# Patient Record
Sex: MALE | Race: OTHER
[De-identification: names, ages, dates, MRNs, and addresses within clinical notes are randomized per-mention and may not be internally consistent; named-entity substitution may affect disease eponyms.]

---

## 2020-10-23 ENCOUNTER — HOSPITAL ENCOUNTER (EMERGENCY)
Dept: HOSPITAL 15 - ER | Age: 30
LOS: 1 days | Discharge: HOME | End: 2020-10-24
Payer: MEDICAID

## 2020-10-23 VITALS — BODY MASS INDEX: 27.62 KG/M2 | WEIGHT: 176 LBS | HEIGHT: 67 IN
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
## 2020-10-23 DIAGNOSIS — S01.512A: Primary | ICD-10-CM
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
## 2020-10-23 DIAGNOSIS — Z88.6: ICD-10-CM
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
## 2020-10-23 PROCEDURE — 41251 REPAIR TONGUE LACERATION: CPT

## 2020-10-23 PROCEDURE — 99284 EMERGENCY DEPT VISIT MOD MDM: CPT

## 2020-10-24 VITALS — DIASTOLIC BLOOD PRESSURE: 61 MMHG | SYSTOLIC BLOOD PRESSURE: 111 MMHG

## 2025-01-24 ENCOUNTER — HOSPITAL ENCOUNTER (EMERGENCY)
Dept: HOSPITAL 15 - ER | Age: 35
Discharge: HOME | End: 2025-01-24
Payer: MEDICAID

## 2025-01-24 VITALS
HEART RATE: 95 BPM | RESPIRATION RATE: 22 BRPM | DIASTOLIC BLOOD PRESSURE: 98 MMHG | SYSTOLIC BLOOD PRESSURE: 135 MMHG | OXYGEN SATURATION: 100 %

## 2025-01-24 VITALS — BODY MASS INDEX: 24.64 KG/M2 | WEIGHT: 156.97 LBS | HEIGHT: 67 IN

## 2025-01-24 DIAGNOSIS — Z88.8: ICD-10-CM
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
## 2025-01-24 DIAGNOSIS — F15.10: Primary | ICD-10-CM

## 2025-01-24 DIAGNOSIS — R06.02: ICD-10-CM

## 2025-01-24 DIAGNOSIS — F10.90: ICD-10-CM

## 2025-01-24 PROCEDURE — 93005 ELECTROCARDIOGRAM TRACING: CPT

## 2025-01-24 NOTE — ED.PDOC
History of Present Illness


HPI Comments


34-year-old male presents with a chief complaint of SOB and anxiety s/p meth use

x 1 hour ago. Patient states that he used IV meth for the second time x 1 hour 

ago. Patient reports that now he feels anxious and SOB. Patient is sating at 99%

on room air and able to speak in full, complete sentences. Patient denies any 

chest pain at this time. Patient endorses drinking a tall can, vape pen, and IV 

meth all together x 1 hour ago. Patient is lethargic in triage. No other 

symptoms or modifying factors present at this time.


Chief Complaint:  Anxiety


Time Seen by MD:  06:37


Primary Care Provider:  NATE Nj Notes:  Medications, Allergies


Allergies:  


Coded Allergies:  


     Acetaminophen (Verified  Allergy, Unknown, 10/23/20)


Information Source:  Patient


Mode of Arrival:  Ambulatory


Severity:  Moderate


Timing:  Hours


Duration:  Since onset


Prehospital treatment:  None





Past Medical History


PAST MEDICAL HISTORY:  Denies


Surgical History:  Denies all surgeries





Family History


Family History:  Reviewed,noncontributory to illness, No family hx of Cancer, No

family hx of DM, No family hx of Heart bari, No family hx of HTN, No family hx 

ofKidney bari, No family hx of Liver bari, No family hx of Lung bari, No family hx 

of Stroke





Social History


Smoker:  Non-Smoker


Alcohol:  Heavy


Drugs:  Methamphetamine


Lives In:  Home





Constitutional:  denies: chills, diaphoresis, fatigue, fever, malaise, sweats, 

weakness, others


EENTM:  denies: blurred vision, double vision, ear bleeding, ear discharge, ear 

drainage, ear pain, ear ringing, eye pain, eye redness, hearing loss, mouth 

pain, mouth swelling, nasal discharge, nose bleeding, nose congestion, nose 

pain, photophobia, tearing, throat pain, throat swelling, voice changes, others


Respiratory:  reports: shortness of breath; denies: cough, hemoptysis, 

orthopnea, SOB at rest, SOB with excertion, stridor, wheezing, others


Cardiovascular:  denies: chest pain, dizzy spells, diaphoresis, Dyspnea on 

exertion, edema, irregular heart beat, left arm pain, lightheadedness, 

palpitations, PND, syncope, others


Gastrointestinal:  denies: abdomen distended, abdominal pain, blood streaked 

bowels, constipated, diarrhea, dysphagia, difficulty swallowing, hematemesis, 

melena, nausea, poor appetite, poor fluid intake, rectal bleeding, rectal pain, 

vomiting, others


Genitourinary:  denies: burning, dysuria, flank pain, frequency, hematuria, 

incontinence, penile discharge, penile sore, pain, testicle pain, testicle 

swelling, urgency, others


Neurological:  denies: dizziness, fainting, headache, left sided numbness, left 

sided weakness, numbness, paresthesia, pre-existing deficit, right sided 

numbness, right sided weakness, seizure, speech problems, tingling, tremors, 

weakness, others


Musculoskeletal:  denies: back pain, gout, joint pain, joint swelling, muscle 

pain, muscle stiffness, neck pain, others


Integumetry:  denies: bruises, change in color, change in hair/nails, dryness, 

laceration, lesions, lumps, rash, wounds, others


Allergic/Immunocompromised:  denies: Difficulty Healing, Frequent Infections, 

Hives, Itching, others


Hematologic/Lymphatic:  denies: anemia, blood clots, easy bleeding, easy 

bruising, swollen glands, others


Endocrine:  denies: excessive hunger, excessive sweating, excessive thirst, 

excessive urination, flushing, intolerance to cold, intolerance to heat, 

unexplained weight gain, unexplained weight loss, others


Psychiatric:  reports: anxiety; denies: bipolar disorder, depression, hopeless, 

panic disorder, schizophrenia, sleepless, suicidal, others


All Other Systems:  Reviewed and Negative





Physical Exam


General Appearance:  No Apparent Distress, Normal, Other (SLEEPY, BUT AWAKENS TO

VOICE AND ANSWERS QUESTIONS)


HEENT:  Normal ENT Inspection, Pharynx Normal, TMs Normal


Neck:  Full Range of Motion, Non-Tender, Normal, Normal Inspection


Respiratory:  Chest Non-Tender, Lungs Clear, No Accessory Muscle Use, No 

Respiratory Distress, Normal Breath Sounds, Other (SHALLOW BREATHING; POOR 

EFFORT)


Cardiovascular:  No Edema, No JVD, No Murmur, No Gallop, Normal Peripheral 

Pulses, Regular Rate/Rhythm


Breast Exam:  Deferred


Gastrointestinal:  No Organomegaly, Non Tender, No Pulsatile Mass, Normal Bowel 

Sounds, Soft


Genitalia:  Deferred


Pelvic:  Deferred


Rectal:  Deferred


Extremities:  No calf tenderness, Normal capillary refill, Normal inspection, 

Normal range of motion, Non-tender, No pedal edema


Musculoskeletal :  


   Apperance:  Normal


Neurologic:  Alert, CNs II-XII nml as Tested, No Motor Deficits, Normal Affect, 

Normal Mood, No Sensory Deficits


Cerebellar Function:  Normal


Reflexes:  Normal


Skin:  Dry, Normal Color, Warm


Lymphatic:  No Adenopathy





Was a procedure done?


Was a procedure done?:  No





EKG


EKG :  


Comments


Normal sinus rhythm rate of 77 no ST changes





Differential Dx


Considerations may include:


METH USE, HEROIN USE, PNEUMONIA, PNEUMOTHORAX, COUGH, DRUG INDUCED ANXIETY





X-Ray, Labs, Meds, VS





                                   Vital Signs








  Date Time  Temp Pulse Resp B/P (MAP) Pulse Ox O2 Delivery O2 Flow Rate FiO2


 


1/24/25 06:16 98.2 95 22 135/98 (110) 100   





34-year-old male presents here with anxiety by triage nurse.  On my evaluation 

he was calm and very sleepy.  He does report some shortness of breath.  He had 

shallow breathing on my evaluation as he was very sleepy.  EKG has been done 

with no significant abnormality.  Chest x-ray has been ordered.  However prior 

to obtaining a chest x-ray patient has eloped.


Time of 1ST Reevaluation:  07:07


Reevaluation 1ST:  Unchanged


Patient Education/Counseling:  Diagnosis, Treatment, Prognosis


Family Education/Counseling:  No Family Present





Departure 1


Departure


Time of Disposition:  06:59


Impression:  


   Primary Impression:  


   Methamphetamine abuse


   Additional Impression:  


   SOB (shortness of breath)


Disposition:  01 HOME / SELF CARE / HOMELESS


Condition:  Stable


Discharged With:  Self





Critical Care Note


Critical Care Time?:  No





Stability


Stability form required:  No





Heart Score


Heart Score:  








Heart Score Response (Comments) Value


 


History Slightly Suspicious 0


 


EKG Normal 0


 


Age <45 0


 


Risk Factors                            1 or 2 risk factors 1


 


Troponin N/A 0


 


Total  1














I personally scribed for MARCO RAGSDALE MD ([email or web address]) on 1/24/25 at 06:59.  

Electronically submitted by Bill Talavera ([email or web address]).





MARCO RAGSDALE MD         Jan 24, 2025 06:59

## 2025-01-30 NOTE — ECG
[facility]

                                       

Test Date:    2025               Test Time:    06:21:58

Pat Name:     VIKAS ZACARIAS          Department:   ER

Patient ID:   [id number]           Room:          

Gender:       M                        Technician:   

:          1990               Requested By: MARCO RAGSDALE

Order Number: [id number]        Reading MD:   Igor Lara

                                 Measurements

Intervals                              Axis          

Rate:         77                       P:            57

NV:           165                      QRS:          85

QRSD:         104                      T:            43

QT:           360                                    

QTc:          408                                    

                           Interpretive Statements

Sinus arrhythmia

RSR' in V1 or V2, probably normal variant

ST elev, probable normal early repol pattern

Electronically Signed On 2025 10:16:53 PST by Igor Lara



Please click the below link to view image of tracing.

## 2025-01-30 NOTE — ECG
[facility]

                                       

Test Date:    2025               Test Time:    06:21:25

Pat Name:     VIKAS ZACARIAS          Department:   ER

Patient ID:   [id number]           Room:          

Gender:       M                        Technician:   

:          1990               Requested By: MARCO RAGSDALE

Order Number: [id number]        Reading MD:   Igor Lara

                                 Measurements

Intervals                              Axis          

Rate:         85                       P:            62

HI:           163                      QRS:          86

QRSD:         103                      T:            44

QT:           351                                    

QTc:          418                                    

                           Interpretive Statements

Sinus rhythm

RSR' in V1 or V2, probably normal variant

ST elev, probable normal early repol pattern

Electronically Signed On 2025 10:16:51 PST by Igor Lara



Please click the below link to view image of tracing.